# Patient Record
Sex: MALE | Race: WHITE
[De-identification: names, ages, dates, MRNs, and addresses within clinical notes are randomized per-mention and may not be internally consistent; named-entity substitution may affect disease eponyms.]

---

## 2018-09-15 ENCOUNTER — HOSPITAL ENCOUNTER (EMERGENCY)
Dept: HOSPITAL 76 - ED | Age: 48
Discharge: HOME | End: 2018-09-15
Payer: COMMERCIAL

## 2018-09-15 VITALS — SYSTOLIC BLOOD PRESSURE: 154 MMHG | DIASTOLIC BLOOD PRESSURE: 86 MMHG

## 2018-09-15 DIAGNOSIS — M23.92: Primary | ICD-10-CM

## 2018-09-15 PROCEDURE — 99283 EMERGENCY DEPT VISIT LOW MDM: CPT

## 2018-09-15 PROCEDURE — 73560 X-RAY EXAM OF KNEE 1 OR 2: CPT

## 2018-09-15 NOTE — XRAY REPORT
Reason:  left knee pain after kneeling

Procedure Date:  09/15/2018   

Accession Number:  [id number] / [id number]                    

Procedure:  XR  - Knee 2 View LT CPT Code:  

 

FULL RESULT:

 

 

EXAM:

LEFT KNEE RADIOGRAPHY

 

EXAM DATE: 9/15/2018 01:03 AM.

 

CLINICAL HISTORY: Left knee pain after kneeling.

 

COMPARISON: None.

 

TECHNIQUE: 2 views.

 

FINDINGS:

Bones: Normal. No fractures or bone lesions.

 

Joints: Normal. No effusion. No subluxations.

 

Soft Tissues: Normal. No soft tissue swelling.

IMPRESSION: Normal knee radiography.

 

[facility]

## 2018-09-15 NOTE — ED PHYSICIAN DOCUMENTATION
PD HPI LOWER EXT INJURY





- Stated complaint


Stated Complaint: LT KNEE INJURY





- Chief complaint


Chief Complaint: Ext Problem





- History obtained from


History obtained from: Patient





- History of Present Illness


PD HPI LOW EXT INJURY LOCATION: Left, Knee


Type of injury: Other


Where injury occurred: Home


Timing - onset: Today


Timing - details: Abrupt onset, Still present


Improved by: Immobilization


Contributing factors: Prior ortho surgery


Similar symptoms before: No diagnosis


Recently seen: Not recently seen





- Additional information


Additional information: 





Patient is a 48 year old male who is presenting to the emergency department for 

left knee pain.  patient states that he was kneeling down on his knee and when 

he stood up he developed left sided knee pain.  Patient reports prior orthopedic

surgery but denies any acute trauma.  





Review of Systems


Ten Systems: 10 systems reviewed and negative


Constitutional: denies: Fever, Chills


Skin: reports: Rash, Lesions


Musculoskeletal: reports: Extremity pain, Joint pain, Joint swelling


Neurologic: denies: Generalized weakness, Focal weakness, Numbness





PD PAST MEDICAL HISTORY





- Allergies


Allergies/Adverse Reactions: 


                                    Allergies











Allergy/AdvReac Type Severity Reaction Status Date / Time


 


No Known Drug Allergies Allergy   Verified 05/11/16 21:07














- Social History


Does the pt smoke?: No


Smoking Status: Never smoker


Does the pt drink ETOH?: No


Does the pt have substance abuse?: No





- Immunizations


Immunizations are current?: Yes





- POLST


Patient has POLST: No





PD ED PE NORMAL





- Vitals


Vital signs reviewed: Yes





- General


General: Alert and oriented X 3, No acute distress





- HEENT


HEENT: Atraumatic, PERRL





- Cardiac


Cardiac: RRR





- Respiratory


Respiratory: No respiratory distress





- Abdomen


Abdomen: Non distended





- Neuro


Neuro: Alert and oriented X 3, No motor deficit


Eye Opening: Spontaneous


Motor: Obeys Commands


Verbal: Oriented


GCS Score: 15





PD ED PE EXPANDED





- Derm


Derm: Other (severe psoriasis throughout his body)





- Extremities


Extremities: Left knee (mild tenderness and minimal swelling to left knee.  no 

erythema)





Results





- Vitals


Vitals: 


                               Vital Signs - 24 hr











  09/15/18





  00:19


 


Temperature 36.8 C


 


Heart Rate 81


 


Respiratory 17





Rate 


 


Blood Pressure 154/86 H


 


O2 Saturation 97








                                     Oxygen











O2 Source                      Room air

















- Rads (name of study)


  ** left knee


Radiology: Final report received, EMP read contemporaneously (no acute fracture 

or dislocation)





PD MEDICAL DECISION MAKING





- ED course


Complexity details: reviewed old records, reviewed results, re-evaluated 

patient, considered differential, d/w patient


ED course: 





patient was seen and examined at bedside.  Imaging was ordered.  when patient 

returned from imaging results were reviewed.  there was no acute fracture or 

dislocation.  patient required no further work up and was stable for discharge 

with outpatient followup.  





- Sepsis Event


Vital Signs: 


                               Vital Signs - 24 hr











  09/15/18





  00:19


 


Temperature 36.8 C


 


Heart Rate 81


 


Respiratory 17





Rate 


 


Blood Pressure 154/86 H


 


O2 Saturation 97








                                     Oxygen











O2 Source                      Room air

















Departure





- Departure


Disposition: 01 Home, Self Care


Clinical Impression: 


 Internal derangement of left knee





Condition: Good


Instructions:  ED Meniscal Injury Knee Poss


Follow-Up: 


Kwaku Wu DO [Primary Care Provider] - 


Comments: 


Your x-rays were normal today.  there is no acute fracture or dislocation.  You 

can take motrin or tylenol as needed for pain.  If your symptoms persist you can

follow up with your doctor or orthopedist for further care.

## 2019-12-06 ENCOUNTER — HOSPITAL ENCOUNTER (OUTPATIENT)
Dept: HOSPITAL 76 - EMS | Age: 49
Discharge: ANOTHER HOSPITAL | End: 2019-12-06
Attending: SURGERY
Payer: SELF-PAY